# Patient Record
(demographics unavailable — no encounter records)

---

## 2019-09-03 NOTE — RAD
TWO VIEWS OF THE CHEST:

 

DATE: 9/3/2019.

 

COMPARISON: 

8/7/2017.

 

HISTORY: 

Dyspnea.

 

FINDINGS: 

Single-lead AICD present, inserted via left subclavian approach.  No pneumothorax, pleural fluid, foc
al consolidation, or alveolar edema.

 

IMPRESSION: 

No acute findings.

 

POS: OFF

## 2020-05-05 NOTE — RAD
EXAM:

Chest PA and lateral:



HISTORY:

Preoperative exam. 



COMPARISON:

9/3/2019



FINDINGS:

Left-sided single lead defibrillator with lead positioned over the expected region of the right ventr
icle.

Heart: Normal cardiac silhouette

Aorta: Unremarkable

Pulmonary vessels: Normal

Costophrenic angles: Costophrenic angles are clear. 

Lungs: No consolidation or masses.

Pneumothorax: No pneumothorax

Osseous structures: No osseous abnormalities

IMPRESSION:

No acute cardiopulmonary process. 







Reported By: Jordan Johnson 

Electronically Signed:  5/5/2020 8:45 AM

## 2020-05-07 NOTE — DIS
DATE OF ADMISSION:  05/06/2020



DATE OF DISCHARGE:  05/06/2020



INDICATION FOR PROCEDURE:  A 60-year-old patient with history of known coronary

artery disease and cardiomyopathy, underwent stress testing due to continued

shortness of breath or worsening of shortness of breath, who was found to have

abnormal stress test with some anterior wall abnormalities.  He was advised to

undergo cardiac catheterization to evaluate the coronary arteries and also to

evaluate the left ventricular systolic function. 



ADMITTING DIAGNOSES:  Include cardiomyopathy, known coronary artery disease,

diabetes, hypertension, dyslipidemia, and obesity. 



DISCHARGE DIAGNOSES:  Include cardiomyopathy, known coronary artery disease,

diabetes, hypertension, dyslipidemia, and obesity. 



PROCEDURES IN THE HOSPITAL:  Included cardiac catheterization, left ventriculogram,

and coronary arteriography using a right radial artery approach. 



DISCHARGE MEDICATIONS:  Include,

1. ProAir HFA aerosol solution two puffs q.6 hours as needed.

2. Albuterol nebulizer solution every 8 hours as needed, that is 2.5 mg per 3 mL.

3. Multivitamins daily.

4. Metformin 1000 mg extended release two tablets twice a day.

5. Lipitor 20 mg daily.

6. Coreg 6.25 mg b.i.d.

7. Aspirin 81 mg a day.

8. Glipizide 5 mg daily.

9. Lasix 40 mg once a day.

10. Entresto 49/51 tablets b.i.d.  In the chart, it is listed as once a day, but it

should be twice a day. 



FOLLOWUP:  He is to follow up with me in the office in the next 2 to 4 weeks.



HOSPITAL COURSE:  This very pleasant 60-year-old gentleman, who has a long history

of cardiomyopathy.  He has undergone AICD implant.  He has had a history of mild

coronary artery disease.  He continued to have shortness of breath.  Stress test

showed some abnormalities in the anterior wall with a defect, and he was advised to

undergo cardiac catheterization to evaluate the coronary artery status.  He was

taken to the cardiac cath lab, where he underwent the procedure today.  He was found

to have the following, left main was normal.  Left anterior descending artery had

60% stenosis in the mid and distal area, the more distally with a 50% stenosis.  He

also has some ectasia in the left anterior descending artery noted in the mid

section.  The diagonal branch was approximately 50% to 60% stenosed.  The left

circumflex had 50% proximal stenosis or plaque formation and then the distal after

takeoff after the first obtuse marginal branch had another 50% stenosis in the

distal circumflex.  The right coronary artery is a large dominant vessel with

ectasia, but no flow-limiting disease or stenosis was noted.  It was noted that the

patient did have some sluggish flow in all coronary arteries.  The left ventricular

systolic function showed ejection fraction of 30% to 35%.  At this time, we will

continue medical management of the patient.  I have advised him to try to lose

weight and continue walking on a daily basis.  We will continue his medication.  We

will adjust as needed.  We will continue to follow the patient.  If he remains

stable, he will be discharged to home within the next 2 to 3 hours.  I will see him

back in the office in 2 to 4 weeks. 







Job ID:  [id number]

## 2020-05-10 NOTE — EKG
Test Reason : 

Blood Pressure : ***/*** mmHG

Vent. Rate : 094 BPM     Atrial Rate : 094 BPM

   P-R Int : 170 ms          QRS Dur : 082 ms

    QT Int : 350 ms       P-R-T Axes : 060 -05 -15 degrees

   QTc Int : 437 ms

 

Normal sinus rhythm

Nonspecific T wave abnormality

Abnormal ECG

When compared with ECG of 13-DEC-2013 11:46,

No significant change was found

Confirmed by LARRY THAKKAR (2) on 5/10/2020 3:50:40 PM

 

Referred By:  ESTELA           Confirmed By:LARRY THAKKAR